# Patient Record
Sex: FEMALE | Race: WHITE | NOT HISPANIC OR LATINO
[De-identification: names, ages, dates, MRNs, and addresses within clinical notes are randomized per-mention and may not be internally consistent; named-entity substitution may affect disease eponyms.]

---

## 2022-01-12 ENCOUNTER — FORM ENCOUNTER (OUTPATIENT)
Age: 77
End: 2022-01-12

## 2022-01-13 ENCOUNTER — TRANSCRIPTION ENCOUNTER (OUTPATIENT)
Age: 77
End: 2022-01-13

## 2022-01-13 PROBLEM — Z00.00 ENCOUNTER FOR PREVENTIVE HEALTH EXAMINATION: Status: ACTIVE | Noted: 2022-01-13

## 2022-01-14 ENCOUNTER — OUTPATIENT (OUTPATIENT)
Dept: INPATIENT UNIT | Facility: HOSPITAL | Age: 77
LOS: 1 days | End: 2022-01-14
Payer: MEDICARE

## 2022-01-14 ENCOUNTER — APPOINTMENT (OUTPATIENT)
Dept: DISASTER EMERGENCY | Facility: HOSPITAL | Age: 77
End: 2022-01-14

## 2022-01-14 VITALS
SYSTOLIC BLOOD PRESSURE: 151 MMHG | HEIGHT: 63 IN | OXYGEN SATURATION: 96 % | WEIGHT: 145.06 LBS | TEMPERATURE: 99 F | HEART RATE: 81 BPM | RESPIRATION RATE: 18 BRPM | DIASTOLIC BLOOD PRESSURE: 78 MMHG

## 2022-01-14 VITALS
HEART RATE: 71 BPM | OXYGEN SATURATION: 96 % | SYSTOLIC BLOOD PRESSURE: 129 MMHG | RESPIRATION RATE: 18 BRPM | TEMPERATURE: 98 F | DIASTOLIC BLOOD PRESSURE: 70 MMHG

## 2022-01-14 DIAGNOSIS — U07.1 COVID-19: ICD-10-CM

## 2022-01-14 PROCEDURE — M0247: CPT

## 2022-01-14 RX ORDER — SOTROVIMAB 62.5 MG/ML
500 INJECTION, SOLUTION, CONCENTRATE INTRAVENOUS ONCE
Refills: 0 | Status: COMPLETED | OUTPATIENT
Start: 2022-01-14 | End: 2022-01-14

## 2022-01-14 RX ORDER — SODIUM CHLORIDE 9 MG/ML
250 INJECTION INTRAMUSCULAR; INTRAVENOUS; SUBCUTANEOUS
Refills: 0 | Status: COMPLETED | OUTPATIENT
Start: 2022-01-14 | End: 2022-01-14

## 2022-01-14 RX ADMIN — SOTROVIMAB 116 MILLIGRAM(S): 62.5 INJECTION, SOLUTION, CONCENTRATE INTRAVENOUS at 08:22

## 2022-01-14 RX ADMIN — SODIUM CHLORIDE 100 MILLILITER(S): 9 INJECTION INTRAMUSCULAR; INTRAVENOUS; SUBCUTANEOUS at 09:00

## 2022-01-14 NOTE — CHART NOTE - NSCHARTNOTEFT_GEN_A_CORE
CC: Monoclonal Antibody Infusion/COVID 19 Positive  76y Female with hx of HTN, DM, Graves disease, GERD, and recent dx of COVID 19+ who presents today for elective Sotrovimab infusion. Patient has been screened and was deemed to be a candidate.    Symptoms/ Criteria  Date of Symptom Onset: 1/8/2022  Symptoms: cough, congestion, malaise   Date of Positive COVID PCR: 1/10/2022  Risk Profile includes:   age     Vaccination Status: Moderna x 3    PMHx:  Infection due to severe acute respiratory syndrome coronavirus 2 (SARS-CoV-2)        Exam/findings:  T(C): --  HR: --  BP: --  RR: --  SpO2: --    PE:   Appearance: NAD	  HEENT:  NC/AT, anicteric  Cardiovascular: Normal S1 S2, No JVD, No murmurs, No edema  Respiratory: Lungs clear to auscultation	  Gastrointestinal:  Soft, Non-tender, + BS	  Skin: warm and dry, no rash or urticaria   Neurologic: Non-focal  Extremities: Normal range of motion    ASSESSMENT:  Pt is COVID positive and symptomatic who was referred to the infusion center for elective Monoclonal antibody infusion (Sotrovimab).    PLAN:  - Infusion procedure explained to patient.  1. FDA has authorized emergency use Sotrovimab, which is not an FDA-approved biological product.  2. The patient or patient's caregiver has the option to accept or refuse administration of Sotrovimab.   3. The significant known and potential risks and benefits of Sotrovimab and the extent to which such risks and benefits are unknown.  4. Information on available alternative treatments and risks and benefits of those alternatives.  5.  Patient verbalized understanding of plan and agrees to treatment.  6.  All questions answered.  - Consent for monoclonal antibody infusion obtained.   - Infuse Sotrovimab 500mg IV over 30 minutes.  - Observe patient for one hour post infusion, and then if stable discharge home with oupt follow up as planned by PMD.    POST INFUSION ASSESSMENT:   Patient completed infusion, and monitored x 1 hour post-infusion with no adverse reactions noted, remained hemodynamically stable.    - Patient tolerated infusion well; denies complaints of chest pain/SOB/dizziness/palpitations.   - VSS for discharge home.  - D/C instructions given/ fact sheet included.  - Patient advised to follow-up with PCP.   - Patient was instructed to self-isolate and use infection control measures (e.g wear mask, isolate, social distance, avoid sharing personal items, clean and disinfect "high touch" surfaces, and frequent handwashing according to the CDC guidelines.     DISCHARGE at __________ CC: Monoclonal Antibody Infusion/COVID 19 Positive  76y Female with hx of HTN, DM, Graves disease, GERD, and recent dx of COVID 19+ who presents today for elective Sotrovimab infusion. Patient has been screened and was deemed to be a candidate.    Symptoms/ Criteria  Date of Symptom Onset: 1/8/2022  Symptoms: cough, congestion, malaise   Date of Positive COVID PCR: 1/10/2022  Risk Profile includes:   age     Vaccination Status: Moderna x 3    PMHx:  Infection due to severe acute respiratory syndrome coronavirus 2 (SARS-CoV-2)    Exam/findings:  Vital Signs Last 24 Hrs      PE:   Appearance: NAD	  HEENT:  NC/AT, anicteric  Cardiovascular: Normal S1 S2, No JVD, No murmurs, No edema  Respiratory: Lungs clear to auscultation	  Gastrointestinal:  Soft, Non-tender, + BS	  Skin: warm and dry, no rash or urticaria   Neurologic: Non-focal  Extremities: Normal range of motion    ASSESSMENT:  Pt is COVID positive and symptomatic who was referred to the infusion center for elective Monoclonal antibody infusion (Sotrovimab).    PLAN:  - Infusion procedure explained to patient.  1. FDA has authorized emergency use Sotrovimab, which is not an FDA-approved biological product.  2. The patient or patient's caregiver has the option to accept or refuse administration of Sotrovimab.   3. The significant known and potential risks and benefits of Sotrovimab and the extent to which such risks and benefits are unknown.  4. Information on available alternative treatments and risks and benefits of those alternatives.  5.  Patient verbalized understanding of plan and agrees to treatment.  6.  All questions answered.  - Consent for monoclonal antibody infusion obtained.   - Infuse Sotrovimab 500mg IV over 30 minutes.  - Observe patient for one hour post infusion, and then if stable discharge home with oupt follow up as planned by PMD.    POST INFUSION ASSESSMENT:   Patient completed infusion, and monitored x 1 hour post-infusion with no adverse reactions noted, remained hemodynamically stable.    - Patient tolerated infusion well; denies complaints of chest pain/SOB/dizziness/palpitations.   - VSS for discharge home.  - D/C instructions given/ fact sheet included.  - Patient advised to follow-up with PCP.   - Patient was instructed to self-isolate and use infection control measures (e.g wear mask, isolate, social distance, avoid sharing personal items, clean and disinfect "high touch" surfaces, and frequent handwashing according to the CDC guidelines.     DISCHARGE at __________ CC: Monoclonal Antibody Infusion/COVID 19 Positive  76y Female with hx of HTN, DM, Graves disease, GERD, and recent dx of COVID 19+ who presents today for elective Sotrovimab infusion. Patient has been screened and was deemed to be a candidate.    Symptoms/ Criteria  Date of Symptom Onset: 1/8/2022  Symptoms: cough, congestion, malaise   Date of Positive COVID PCR: 1/10/2022  Risk Profile includes:   age     Vaccination Status: Moderna x 3    PMHx:  Infection due to severe acute respiratory syndrome coronavirus 2 (SARS-CoV-2)    Exam/findings:    PE:   Appearance: NAD	  HEENT:  NC/AT, anicteric  Cardiovascular: Normal S1 S2, No JVD, No murmurs, No edema  Respiratory: Lungs clear to auscultation	  Gastrointestinal:  Soft, Non-tender, + BS	  Skin: warm and dry, no rash or urticaria   Neurologic: Non-focal  Extremities: Normal range of motion    ASSESSMENT:  Pt is COVID positive and symptomatic who was referred to the infusion center for elective Monoclonal antibody infusion (Sotrovimab).    PLAN:  - Infusion procedure explained to patient.  1. FDA has authorized emergency use Sotrovimab, which is not an FDA-approved biological product.  2. The patient or patient's caregiver has the option to accept or refuse administration of Sotrovimab.   3. The significant known and potential risks and benefits of Sotrovimab and the extent to which such risks and benefits are unknown.  4. Information on available alternative treatments and risks and benefits of those alternatives.  5.  Patient verbalized understanding of plan and agrees to treatment.  6.  All questions answered.  - Consent for monoclonal antibody infusion obtained.   - Infuse Sotrovimab 500mg IV over 30 minutes.  - Observe patient for one hour post infusion, and then if stable discharge home with oupt follow up as planned by PMD.    POST INFUSION ASSESSMENT:   Patient completed infusion, and monitored x 1 hour post-infusion with no adverse reactions noted, remained hemodynamically stable.    - Patient tolerated infusion well; denies complaints of chest pain/SOB/dizziness/palpitations.   - VSS for discharge home.  - D/C instructions given/ fact sheet included.  - Patient advised to follow-up with PCP.   - Patient was instructed to self-isolate and use infection control measures (e.g wear mask, isolate, social distance, avoid sharing personal items, clean and disinfect "high touch" surfaces, and frequent handwashing according to the CDC guidelines.     DISCHARGE at __________ CC: Monoclonal Antibody Infusion/COVID 19 Positive  76y Female with hx of HTN, DM, Graves disease, GERD, and recent dx of COVID 19+ who presents today for elective Sotrovimab infusion. Patient has been screened and was deemed to be a candidate.    Symptoms/ Criteria  Date of Symptom Onset: 1/8/2022  Symptoms: cough, congestion, malaise   Date of Positive COVID PCR: 1/10/2022  Risk Profile includes:   age     Vaccination Status: Moderna x 3    PMHx:  Infection due to severe acute respiratory syndrome coronavirus 2 (SARS-CoV-2)    Exam/findings:  Vital Signs Last 24 Hrs  T(C): 36.9 (14 Jan 2022 08:37), Max: 37.1 (14 Jan 2022 08:06)  T(F): 98.5 (14 Jan 2022 08:37), Max: 98.8 (14 Jan 2022 08:06)  HR: 78 (14 Jan 2022 08:37) (78 - 81)  BP: 129/65 (14 Jan 2022 08:37) (129/65 - 151/78)  BP(mean): --  RR: 18 (14 Jan 2022 08:37) (18 - 18)  SpO2: 95% (14 Jan 2022 08:37) (95% - 96%)      PE:   Appearance: NAD	  HEENT:  NC/AT, anicteric  Cardiovascular: Normal S1 S2, No JVD, No murmurs, No edema  Respiratory: Lungs clear to auscultation	  Gastrointestinal:  Soft, Non-tender, + BS	  Skin: warm and dry, no rash or urticaria   Neurologic: Non-focal  Extremities: Normal range of motion    ASSESSMENT:  Pt is COVID positive and symptomatic who was referred to the infusion center for elective Monoclonal antibody infusion (Sotrovimab).    PLAN:  - Infusion procedure explained to patient.  1. FDA has authorized emergency use Sotrovimab, which is not an FDA-approved biological product.  2. The patient or patient's caregiver has the option to accept or refuse administration of Sotrovimab.   3. The significant known and potential risks and benefits of Sotrovimab and the extent to which such risks and benefits are unknown.  4. Information on available alternative treatments and risks and benefits of those alternatives.  5.  Patient verbalized understanding of plan and agrees to treatment.  6.  All questions answered.  - Consent for monoclonal antibody infusion obtained.   - Infuse Sotrovimab 500mg IV over 30 minutes.  - Observe patient for one hour post infusion, and then if stable discharge home with oupt follow up as planned by PMD.    POST INFUSION ASSESSMENT:   Patient completed infusion, and monitored x 1 hour post-infusion with no adverse reactions noted, remained hemodynamically stable.    - Patient tolerated infusion well; denies complaints of chest pain/SOB/dizziness/palpitations.   - VSS for discharge home.  - D/C instructions given/ fact sheet included.  - Patient advised to follow-up with PCP.   - Patient was instructed to self-isolate and use infection control measures (e.g wear mask, isolate, social distance, avoid sharing personal items, clean and disinfect "high touch" surfaces, and frequent handwashing according to the CDC guidelines.     DISCHARGE at __________ CC: Monoclonal Antibody Infusion/COVID 19 Positive  76y Female with hx of HTN, DM, Graves disease, GERD, and recent dx of COVID 19+ who presents today for elective Sotrovimab infusion. Patient has been screened and was deemed to be a candidate.    Symptoms/ Criteria  Date of Symptom Onset: 1/8/2022  Symptoms: cough, congestion, malaise   Date of Positive COVID PCR: 1/10/2022  Risk Profile includes:   age     Vaccination Status: Moderna x 3    PMHx:  Infection due to severe acute respiratory syndrome coronavirus 2 (SARS-CoV-2)    Exam/findings:  Vital Signs Last 24 Hrs  T(C): 36.9 (14 Jan 2022 08:37), Max: 37.1 (14 Jan 2022 08:06)  T(F): 98.5 (14 Jan 2022 08:37), Max: 98.8 (14 Jan 2022 08:06)  HR: 78 (14 Jan 2022 08:37) (78 - 81)  BP: 129/65 (14 Jan 2022 08:37) (129/65 - 151/78)  BP(mean): --  RR: 18 (14 Jan 2022 08:37) (18 - 18)  SpO2: 95% (14 Jan 2022 08:37) (95% - 96%)      PE:   Appearance: NAD	  HEENT:  NC/AT, anicteric  Cardiovascular: Normal S1 S2, No JVD, No murmurs, No edema  Respiratory: Lungs clear to auscultation	  Gastrointestinal:  Soft, Non-tender, + BS	  Skin: warm and dry, no rash or urticaria   Neurologic: Non-focal  Extremities: Normal range of motion    ASSESSMENT:  Pt is COVID positive and symptomatic who was referred to the infusion center for elective Monoclonal antibody infusion (Sotrovimab).    PLAN:  - Infusion procedure explained to patient.  1. FDA has authorized emergency use Sotrovimab, which is not an FDA-approved biological product.  2. The patient or patient's caregiver has the option to accept or refuse administration of Sotrovimab.   3. The significant known and potential risks and benefits of Sotrovimab and the extent to which such risks and benefits are unknown.  4. Information on available alternative treatments and risks and benefits of those alternatives.  5.  Patient verbalized understanding of plan and agrees to treatment.  6.  All questions answered.  - Consent for monoclonal antibody infusion obtained.   - Infuse Sotrovimab 500mg IV over 30 minutes.  - Observe patient for one hour post infusion, and then if stable discharge home with oupt follow up as planned by PMD.    POST INFUSION ASSESSMENT:   Patient completed infusion, and monitored x 1 hour post-infusion with no adverse reactions noted, remained hemodynamically stable.    - Patient tolerated infusion well; denies complaints of chest pain/SOB/dizziness/palpitations.   - VSS for discharge home.  - D/C instructions given/ fact sheet included.  - Patient advised to follow-up with PCP.   - Patient was instructed to self-isolate and use infection control measures (e.g wear mask, isolate, social distance, avoid sharing personal items, clean and disinfect "high touch" surfaces, and frequent handwashing according to the CDC guidelines.     DISCHARGE at 10:10am.

## 2022-02-25 ENCOUNTER — NON-APPOINTMENT (OUTPATIENT)
Age: 77
End: 2022-02-25

## 2022-02-28 ENCOUNTER — APPOINTMENT (OUTPATIENT)
Dept: PULMONOLOGY | Facility: CLINIC | Age: 77
End: 2022-02-28
Payer: MEDICARE

## 2022-02-28 ENCOUNTER — NON-APPOINTMENT (OUTPATIENT)
Age: 77
End: 2022-02-28

## 2022-02-28 VITALS
DIASTOLIC BLOOD PRESSURE: 68 MMHG | BODY MASS INDEX: 26.25 KG/M2 | HEART RATE: 79 BPM | HEIGHT: 63.5 IN | WEIGHT: 150 LBS | OXYGEN SATURATION: 98 % | TEMPERATURE: 97.5 F | SYSTOLIC BLOOD PRESSURE: 105 MMHG

## 2022-02-28 DIAGNOSIS — E11.9 TYPE 2 DIABETES MELLITUS W/OUT COMPLICATIONS: ICD-10-CM

## 2022-02-28 DIAGNOSIS — I10 ESSENTIAL (PRIMARY) HYPERTENSION: ICD-10-CM

## 2022-02-28 DIAGNOSIS — K21.9 GASTRO-ESOPHAGEAL REFLUX DISEASE W/OUT ESOPHAGITIS: ICD-10-CM

## 2022-02-28 PROCEDURE — 99204 OFFICE O/P NEW MOD 45 MIN: CPT

## 2022-02-28 RX ORDER — ALBUTEROL SULFATE 90 UG/1
108 (90 BASE) INHALANT RESPIRATORY (INHALATION)
Qty: 8 | Refills: 0 | Status: ACTIVE | COMMUNITY
Start: 2022-01-13

## 2022-02-28 RX ORDER — IRBESARTAN 150 MG/1
150 TABLET ORAL
Qty: 90 | Refills: 0 | Status: ACTIVE | COMMUNITY
Start: 2022-01-18

## 2022-02-28 RX ORDER — ICOSAPENT ETHYL 1000 MG/1
1 CAPSULE ORAL
Qty: 360 | Refills: 0 | Status: ACTIVE | COMMUNITY
Start: 2021-05-11

## 2022-02-28 RX ORDER — ESZOPICLONE 2 MG/1
2 TABLET, FILM COATED ORAL
Qty: 30 | Refills: 0 | Status: ACTIVE | COMMUNITY
Start: 2021-12-13

## 2022-02-28 RX ORDER — RALOXIFENE HYDROCHLORIDE 60 MG/1
60 TABLET, FILM COATED ORAL
Qty: 90 | Refills: 0 | Status: ACTIVE | COMMUNITY
Start: 2021-10-29

## 2022-02-28 RX ORDER — DIAZEPAM 5 MG/1
5 TABLET ORAL
Qty: 30 | Refills: 0 | Status: ACTIVE | COMMUNITY
Start: 2022-01-26

## 2022-02-28 RX ORDER — DICLOFENAC SODIUM 1% 10 MG/G
1 GEL TOPICAL
Qty: 200 | Refills: 0 | Status: ACTIVE | COMMUNITY
Start: 2021-11-23

## 2022-02-28 RX ORDER — HYDROCHLOROTHIAZIDE 25 MG/1
25 TABLET ORAL
Qty: 90 | Refills: 0 | Status: ACTIVE | COMMUNITY
Start: 2022-01-31

## 2022-02-28 RX ORDER — ATORVASTATIN CALCIUM 20 MG/1
20 TABLET, FILM COATED ORAL
Qty: 90 | Refills: 0 | Status: ACTIVE | COMMUNITY
Start: 2021-09-23

## 2022-02-28 RX ORDER — METHIMAZOLE 5 MG/1
5 TABLET ORAL
Qty: 90 | Refills: 0 | Status: ACTIVE | COMMUNITY
Start: 2021-10-22

## 2022-02-28 RX ORDER — DEXLANSOPRAZOLE 30 MG/1
30 CAPSULE, DELAYED RELEASE ORAL
Qty: 90 | Refills: 0 | Status: ACTIVE | COMMUNITY
Start: 2022-01-18

## 2022-02-28 RX ORDER — ONDANSETRON 4 MG/1
4 TABLET ORAL
Qty: 30 | Refills: 0 | Status: ACTIVE | COMMUNITY
Start: 2022-01-18

## 2022-02-28 RX ORDER — LANCETS 28 GAUGE
EACH MISCELLANEOUS
Qty: 100 | Refills: 0 | Status: ACTIVE | COMMUNITY
Start: 2021-12-28

## 2022-02-28 RX ORDER — SEMAGLUTIDE 1.34 MG/ML
4 INJECTION, SOLUTION SUBCUTANEOUS
Qty: 9 | Refills: 0 | Status: ACTIVE | COMMUNITY
Start: 2022-01-18

## 2022-02-28 NOTE — HISTORY OF PRESENT ILLNESS
[Never] : never [TextBox_4] : 76 female no hx tobacco\par Presents today bec of nodules on ct chest\par ct chest performed bec of fall and chest pain and trauma\par   NOv 2021 \par today feels good no sob no cough no wheeze no chest pain no tightness\par no wt loss no hemoptysis\par no chemical no asbestos ++ second hand tobacco

## 2022-02-28 NOTE — PROCEDURE
[FreeTextEntry1] : CAT scan of the chest performed on 12/30/2021 bilateral calcified and noncalcified nodules largest 5 mm noncalcified right upper lobe

## 2022-02-28 NOTE — REASON FOR VISIT
[Initial] : an initial visit [Pulmonary Nodules] : pulmonary nodules [TextBox_44] : assessment. Pt does have hx of COVID-19 in late December of 2021. Pt states that sx were minimal but she did receive antibody infusion. Pt is here to discuss plan of care for pulmonary nodules, currently has no pulmonary complaints. Pt has no smoking hx but exposed to second hand smoke.

## 2022-02-28 NOTE — DISCUSSION/SUMMARY
[FreeTextEntry1] : Ms Timmons has bilateral pulmonary nodules.  The largest is 5 mm and is noncalcified in the right upper lobe.  She is a low risk patient as she has never smoked cigarettes.  Per the current recommendation a follow-up CAT scan should be done in 1 years time.  There are calcified nodules and this usually indicates old inflammation and these are not malignant.  There is been thoroughly displaying to the patient she understands and agrees.\par The patient understands and agrees with plan of care.\par Today's office visit encompassed 46  minutes. I conducted an extensive history ,physical exam and reviewed diagnosis and treatment options  including diagnostic tests,radiologic studies including cat scans  and the use of prescription medication.\par

## 2023-01-04 ENCOUNTER — NON-APPOINTMENT (OUTPATIENT)
Age: 78
End: 2023-01-04

## 2023-02-27 ENCOUNTER — APPOINTMENT (OUTPATIENT)
Dept: PULMONOLOGY | Facility: CLINIC | Age: 78
End: 2023-02-27
Payer: MEDICARE

## 2023-02-27 VITALS
BODY MASS INDEX: 27.3 KG/M2 | DIASTOLIC BLOOD PRESSURE: 62 MMHG | HEART RATE: 94 BPM | HEIGHT: 63.5 IN | TEMPERATURE: 97.6 F | OXYGEN SATURATION: 96 % | SYSTOLIC BLOOD PRESSURE: 104 MMHG | WEIGHT: 156 LBS

## 2023-02-27 PROCEDURE — 99214 OFFICE O/P EST MOD 30 MIN: CPT

## 2023-02-27 NOTE — REASON FOR VISIT
[Pulmonary Nodules] : pulmonary nodules [TextBox_44] : 1 year.  Patient states she has no Pulmonary complaints at this time.  She is here to follow up on her Pulmonary Nodules.  She had a CT scan 1/20/23 at .

## 2023-02-27 NOTE — PROCEDURE
[FreeTextEntry1] : CAT scan of the chest 1/20/2023 compared to December 2021 no change in scarring right middle lobe and lingula.  No change bilateral nodules no change granuloma.

## 2023-02-27 NOTE — HISTORY OF PRESENT ILLNESS
[Never] : never [TextBox_4] : 77 female no hx tobacco for fu re nodules\par feels good  no shortness of breath no cough no wheeze tightness \par no hemoptysis no weight loss \par no hx cancer since last visit\par routine mammogram and gi eval

## 2023-02-27 NOTE — DISCUSSION/SUMMARY
[FreeTextEntry1] : Ms. Timmons has no history of tobacco smoking but significant secondhand smoke exposure.  Her CAT scan shows no change in the nodules largest 5 mm.  I had a long caution with the patient regarding the need for follow-up CAT scans.  Per the recommendations follow-up after 1 year in a non-smoker is not definitely required.  Because of the concern of her secondhand smoke we will follow-up CAT scan in 1 year from now.  If the nodule stable left 2 years no further CAT scans are indicated.  The patient understands and agrees with this plan of care.\par The patient understands and agrees with plan of care.\par Today's office visit encompassed 32 minutes. I conducted an extensive history ,physical exam and reviewed diagnosis and treatment options  including diagnostic tests,radiologic studies including  cat scans  and the use of prescription medication.

## 2023-10-09 ENCOUNTER — APPOINTMENT (OUTPATIENT)
Dept: ORTHOPEDIC SURGERY | Facility: CLINIC | Age: 78
End: 2023-10-09

## 2023-11-14 ENCOUNTER — APPOINTMENT (OUTPATIENT)
Dept: ORTHOPEDIC SURGERY | Facility: CLINIC | Age: 78
End: 2023-11-14
Payer: MEDICARE

## 2023-11-14 VITALS — WEIGHT: 153 LBS | HEIGHT: 63 IN | BODY MASS INDEX: 27.11 KG/M2

## 2023-11-14 DIAGNOSIS — Z78.9 OTHER SPECIFIED HEALTH STATUS: ICD-10-CM

## 2023-11-14 DIAGNOSIS — S83.242A OTHER TEAR OF MEDIAL MENISCUS, CURRENT INJURY, LEFT KNEE, INITIAL ENCOUNTER: ICD-10-CM

## 2023-11-14 DIAGNOSIS — M17.12 UNILATERAL PRIMARY OSTEOARTHRITIS, LEFT KNEE: ICD-10-CM

## 2023-11-14 PROCEDURE — 73564 X-RAY EXAM KNEE 4 OR MORE: CPT | Mod: LT

## 2023-11-14 PROCEDURE — 99203 OFFICE O/P NEW LOW 30 MIN: CPT

## 2024-01-04 ENCOUNTER — NON-APPOINTMENT (OUTPATIENT)
Age: 79
End: 2024-01-04

## 2024-01-30 ENCOUNTER — APPOINTMENT (OUTPATIENT)
Dept: PULMONOLOGY | Facility: CLINIC | Age: 79
End: 2024-01-30
Payer: MEDICARE

## 2024-01-30 VITALS
HEART RATE: 76 BPM | WEIGHT: 155 LBS | DIASTOLIC BLOOD PRESSURE: 74 MMHG | TEMPERATURE: 97.7 F | OXYGEN SATURATION: 94 % | HEIGHT: 63 IN | BODY MASS INDEX: 27.46 KG/M2 | SYSTOLIC BLOOD PRESSURE: 122 MMHG

## 2024-01-30 DIAGNOSIS — R91.8 OTHER NONSPECIFIC ABNORMAL FINDING OF LUNG FIELD: ICD-10-CM

## 2024-01-30 PROCEDURE — 99214 OFFICE O/P EST MOD 30 MIN: CPT

## 2024-01-30 NOTE — REASON FOR VISIT
[Follow-Up] : a follow-up visit [Pulmonary Nodules] : pulmonary nodules [TextBox_44] : 1 year. Pt states she had a Chest Xray in December. Pt states she caught covid 2 months ago but it has leveled off now, No new pulmonary complaints at this time.

## 2024-01-30 NOTE — HISTORY OF PRESENT ILLNESS
[Never] : never [TextBox_4] : 78 female no hx tobacco  today feels fair bec of heart issue told echo with worse Tri cuspid regurg hx covid and  since cough  but has improved  no fever no cough no phlegm  no wt loss no hemoptysis  no hx cancer no albuterol use in 1 yr

## 2024-01-30 NOTE — PROCEDURE
[FreeTextEntry1] : CAT scan of the chest 12/20/2023 compared with 1/20/2023 and 12/30/2021 no change 4 mm right upper lobe nodule. New focus mucous plugging right middle lobe stable scarring lingula and right middle lobe.

## 2024-01-30 NOTE — DISCUSSION/SUMMARY
[FreeTextEntry1] : Ms Timmons has a CAT scan of the chest with stable nodules and scarring in the lingula and right middle lobe for 2 years.  Recent CAT scan revealed mucous plugging in the right middle lobe.  She is a low risk patient as she has never smoked cigarettes.  I do not think mucus requires a follow-up CAT scan of the chest.  The patient is going to be getting a CAT scan of the heart and this certainly could show these areas.  An extensive discussion with her and unless any symptoms occur no further CAT scan for the pulmonary issues will be performed. The patient understands and agrees with plan of care. Today's office visit encompassed 32 minutes. I conducted an extensive history, physical exam and reviewed diagnosis and treatment options including diagnostic tests,radiology studies including cat scans and the use of prescription medication.